# Patient Record
Sex: FEMALE | Race: WHITE
[De-identification: names, ages, dates, MRNs, and addresses within clinical notes are randomized per-mention and may not be internally consistent; named-entity substitution may affect disease eponyms.]

---

## 2017-02-23 ENCOUNTER — HOSPITAL ENCOUNTER (OUTPATIENT)
Dept: HOSPITAL 17 - CLAB | Age: 47
End: 2017-02-23
Payer: COMMERCIAL

## 2017-02-23 DIAGNOSIS — E55.9: Primary | ICD-10-CM

## 2017-02-23 PROCEDURE — 82306 VITAMIN D 25 HYDROXY: CPT

## 2017-02-23 PROCEDURE — 36415 COLL VENOUS BLD VENIPUNCTURE: CPT

## 2017-04-01 ENCOUNTER — HOSPITAL ENCOUNTER (EMERGENCY)
Dept: HOSPITAL 17 - NETRI | Age: 47
Discharge: HOME | End: 2017-04-01
Payer: COMMERCIAL

## 2017-04-01 VITALS
HEART RATE: 75 BPM | RESPIRATION RATE: 16 BRPM | DIASTOLIC BLOOD PRESSURE: 56 MMHG | SYSTOLIC BLOOD PRESSURE: 125 MMHG | OXYGEN SATURATION: 96 % | TEMPERATURE: 97.8 F

## 2017-04-01 VITALS — BODY MASS INDEX: 21.08 KG/M2 | HEIGHT: 64 IN | WEIGHT: 123.46 LBS

## 2017-04-01 DIAGNOSIS — E03.9: ICD-10-CM

## 2017-04-01 DIAGNOSIS — J45.909: ICD-10-CM

## 2017-04-01 DIAGNOSIS — F32.9: ICD-10-CM

## 2017-04-01 DIAGNOSIS — Z77.21: Primary | ICD-10-CM

## 2017-04-01 PROCEDURE — 99283 EMERGENCY DEPT VISIT LOW MDM: CPT

## 2017-04-01 NOTE — PD
HPI


Chief Complaint:  Eye Problems/Injury


Time Seen by Provider:  20:22


Travel History


International Travel<30 days:  No


Contact w/Intl Traveler<30days:  No


Traveled to known affect area:  No





History of Present Illness


HPI


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 

evaluation of a blood exposure to her face which occurred approximately 30-60 

minutes prior to arrival.  She states that blood from [employer] splattered into 

her face.  This had come from a trauma alert patient earlier.  Source patient 

is known.  The patient washed her face with a purple wipe before coming to the 

ER.  She states that she is up-to-date with immunizations.  She is up-to-date 

with tetanus and hepatitis B.  She denies any ocular pain.  No open sores on 

the face.  She does not feel that any blood when into her mouth.  Patient does 

not wear glasses or contacts





[facility]


Past Medical History


*** Narrative Medical


Depression, Asthma, hypothyroid


Asthma:  Yes


Depression:  Yes


Reproductive:  Yes (ovarian cyst)


Thyroid Disease:  Yes


Tetanus Vaccination:  < 5 Years


Pregnant?:  Not Pregnant


Dilation and Curettage (D&C):  Yes





Past Surgical History


Abdominal Surgery:  Yes (exp. lap)


Appendectomy:  Yes (1994)


Joint Replacement:  Yes (knee scope)


Other Surgery:  Yes (cervical ablation/breast augmentation)





Social History


Alcohol Use:  Yes (occ)


Tobacco Use:  No


Substance Use:  No





Allergies-Medications


(Allergen,Severity, Reaction):  


Coded Allergies:  


     Codeine (Verified  Allergy, Severe, 4/1/17)


     Phenergan (Verified  Allergy, Severe, 4/1/17)


Reported Meds & Prescriptions





Reported Meds & Active Scripts


Active


Mobic (Meloxicam) 15 Mg Tab 15 Mg PO DAILY 


Norco 5/325 (Hydrocodone/Acetaminophen 5/325) 5 mg/325 mg Tab 1 Tab PO Q6H PRN


Reported


Xanax 0.5 mg (Alprazolam) Alprazolam 0.5 mg Tab 1 Tab PO HS PRN


Trimox 500 Mg Cap (Amoxicillin) 500 Mg Cap 500 Mg PO BID 


Lexapro (Escitalopram Oxalate) 10 Mg Tab 10 Mg PO DAILY 


Synthroid 75 mcg (Levothyroxine Sodium) 75 Mcg Tab 75 Mcg PO DAILY 








Review of Systems


Except as stated in HPI:  all other systems reviewed are Neg


General / Constitutional:  No: Fever, Chills


Eyes:  No: Diploplia, Blurred Vision, Photophobia, Drainage, Redness, Foreign 

Body Sensation, Pain, Tearing, Visual changes





Physical Exam


Narrative


GENERAL: This is a well-nourished, well-developed patient, in no apparent 

distress.


SKIN: No rashes, ecchymoses or lesions. Warm and dry.


HEAD: Atraumatic. Normocephalic.


EYES: PERRL, EOMI, no discharge or injection. No scleral icterus.


EARS: Clear


NOSE: Nasal turbinates appear normal.


THROAT: Mucosa pink and moist.  Airway patent.


NECK: Trachea midline. supple, moves head freely.


LUNGS: Clear to auscultation.


CV: Regular in rhythm.


ABDOMEN: Soft nontender.


EXT: No clubbing cyanosis or edema.





Data


Data


Last Documented VS





Vital Signs








  Date Time  Temp Pulse Resp B/P Pulse Ox O2 Delivery O2 Flow Rate FiO2


 


4/1/17 19:34 97.8 75 16 125/56 96 Room Air  











MDM


Medical Decision Making


Medical Screen Exam Complete:  Yes


Emergency Medical Condition:  Yes


Medical Record Reviewed:  Yes


Differential Diagnosis


Differential diagnoses: Blood exposure, body fluid exposure, abrasion, contusion


Narrative Course


The patient's face is washed immediately here in the ER.  2 drops about cane 

are instilled in both eyes and she is irrigated at the eyewash station.  I have 

confirmed the patient's immunizations status.  Source patient is known and the 

supervisor is notified that a rapid HIV is required.  At this point post 

exposure prophylaxis is not indicated.  The patient will be notified of the 

source patient's HIV status.  A contact numbers been given.  The supervisor in 

the ER we'll notify the patient if the source patient's test is positive.  At 

that time we will revisit post exposure prophylaxis.





This is a body fluid/blood exposure





Diagnosis





 Primary Impression:  


 Exposure to blood or body fluid





***Additional Instructions:


Rest.


The night supervisor will contact you if the source patient is positive.  If 

you are not contacted and assumed the test to be negative.


Follow-up with employee med.


Disposition:  01 DISCHARGE HOME


Condition:  Stable








Jah Riddle Apr 1, 2017 20:28

## 2017-08-04 ENCOUNTER — HOSPITAL ENCOUNTER (OUTPATIENT)
Dept: HOSPITAL 17 - CLAB | Age: 47
End: 2017-08-04
Payer: COMMERCIAL

## 2017-08-04 DIAGNOSIS — E55.9: Primary | ICD-10-CM

## 2017-08-04 DIAGNOSIS — E03.9: ICD-10-CM

## 2017-08-04 LAB
ALP SERPL-CCNC: 43 U/L (ref 45–117)
ALT SERPL-CCNC: 20 U/L (ref 10–53)
ANION GAP SERPL CALC-SCNC: 10 MEQ/L (ref 5–15)
AST SERPL-CCNC: 13 U/L (ref 15–37)
BASOPHILS # BLD AUTO: 0.1 TH/MM3 (ref 0–0.2)
BASOPHILS NFR BLD: 0.9 % (ref 0–2)
BILIRUB SERPL-MCNC: 0.7 MG/DL (ref 0.2–1)
BUN SERPL-MCNC: 10 MG/DL (ref 7–18)
CHLORIDE SERPL-SCNC: 106 MEQ/L (ref 98–107)
EOSINOPHIL # BLD: 0.1 TH/MM3 (ref 0–0.4)
EOSINOPHIL NFR BLD: 1.1 % (ref 0–4)
ERYTHROCYTE [DISTWIDTH] IN BLOOD BY AUTOMATED COUNT: 11.8 % (ref 11.6–17.2)
GFR SERPLBLD BASED ON 1.73 SQ M-ARVRAT: 77 ML/MIN (ref 89–?)
HCO3 BLD-SCNC: 25 MEQ/L (ref 21–32)
HCT VFR BLD CALC: 42.6 % (ref 35–46)
HDLC SERPL-MCNC: 73.7 MG/DL (ref 40–60)
HEMO FLAGS: (no result)
LDLC SERPL-MCNC: 55 MG/DL (ref 0–99)
LYMPHOCYTES # BLD AUTO: 1.9 TH/MM3 (ref 1–4.8)
LYMPHOCYTES NFR BLD AUTO: 28.1 % (ref 9–44)
MCH RBC QN AUTO: 30.5 PG (ref 27–34)
MCHC RBC AUTO-ENTMCNC: 33.6 % (ref 32–36)
MCV RBC AUTO: 90.7 FL (ref 80–100)
MONOCYTES NFR BLD: 5.6 % (ref 0–8)
NEUTROPHILS # BLD AUTO: 4.2 TH/MM3 (ref 1.8–7.7)
NEUTROPHILS NFR BLD AUTO: 64.3 % (ref 16–70)
PLATELET # BLD: 202 TH/MM3 (ref 150–450)
POTASSIUM SERPL-SCNC: 3.9 MEQ/L (ref 3.5–5.1)
RBC # BLD AUTO: 4.7 MIL/MM3 (ref 4–5.3)
SODIUM SERPL-SCNC: 141 MEQ/L (ref 136–145)
T3FREE SERPL-MCNC: 2.32 PG/ML (ref 2.18–3.98)
T4 FREE SERPL-MCNC: 1.04 NG/DL (ref 0.76–1.46)
WBC # BLD AUTO: 6.6 TH/MM3 (ref 4–11)

## 2017-08-04 PROCEDURE — 84439 ASSAY OF FREE THYROXINE: CPT

## 2017-08-04 PROCEDURE — 82306 VITAMIN D 25 HYDROXY: CPT

## 2017-08-04 PROCEDURE — 80053 COMPREHEN METABOLIC PANEL: CPT

## 2017-08-04 PROCEDURE — 84443 ASSAY THYROID STIM HORMONE: CPT

## 2017-08-04 PROCEDURE — 36415 COLL VENOUS BLD VENIPUNCTURE: CPT

## 2017-08-04 PROCEDURE — 85025 COMPLETE CBC W/AUTO DIFF WBC: CPT

## 2017-08-04 PROCEDURE — 80061 LIPID PANEL: CPT

## 2017-08-04 PROCEDURE — 84481 FREE ASSAY (FT-3): CPT

## 2017-12-07 ENCOUNTER — HOSPITAL ENCOUNTER (OUTPATIENT)
Dept: HOSPITAL 17 - CLAB | Age: 47
End: 2017-12-07
Payer: COMMERCIAL

## 2017-12-07 DIAGNOSIS — E56.9: ICD-10-CM

## 2017-12-07 DIAGNOSIS — R63.5: ICD-10-CM

## 2017-12-07 DIAGNOSIS — M62.50: ICD-10-CM

## 2017-12-07 DIAGNOSIS — E03.9: Primary | ICD-10-CM

## 2017-12-07 DIAGNOSIS — E55.9: ICD-10-CM

## 2017-12-07 DIAGNOSIS — R60.1: ICD-10-CM

## 2017-12-07 DIAGNOSIS — M79.1: ICD-10-CM

## 2017-12-07 DIAGNOSIS — G47.9: ICD-10-CM

## 2017-12-07 DIAGNOSIS — R79.9: ICD-10-CM

## 2017-12-07 LAB
ALP SERPL-CCNC: 45 U/L (ref 45–117)
ALT SERPL-CCNC: 18 U/L (ref 10–53)
ANION GAP SERPL CALC-SCNC: 7 MEQ/L (ref 5–15)
AST SERPL-CCNC: 11 U/L (ref 15–37)
BASOPHILS # BLD AUTO: 0.1 TH/MM3 (ref 0–0.2)
BASOPHILS NFR BLD: 1.4 % (ref 0–2)
BILIRUB SERPL-MCNC: 0.7 MG/DL (ref 0.2–1)
BUN SERPL-MCNC: 19 MG/DL (ref 7–18)
CHLORIDE SERPL-SCNC: 108 MEQ/L (ref 98–107)
EOSINOPHIL # BLD: 0.1 TH/MM3 (ref 0–0.4)
EOSINOPHIL NFR BLD: 1.8 % (ref 0–4)
ERYTHROCYTE [DISTWIDTH] IN BLOOD BY AUTOMATED COUNT: 12.3 % (ref 11.6–17.2)
GFR SERPLBLD BASED ON 1.73 SQ M-ARVRAT: 68 ML/MIN (ref 89–?)
HCO3 BLD-SCNC: 24.8 MEQ/L (ref 21–32)
HCT VFR BLD CALC: 42.4 % (ref 35–46)
HDLC SERPL-MCNC: 74 MG/DL (ref 40–60)
HEMO FLAGS: (no result)
HEMOGLOBIN A1A: 1.3 %
HEMOGLOBIN A1B: 1.4 %
HEMOGLOBIN AO: 86.9 %
HEMOGLOBIN LA1C: 1.9 %
HEMOGLOBIN P3: 3.4 %
LDLC SERPL-MCNC: 63 MG/DL (ref 0–99)
LYMPHOCYTES # BLD AUTO: 1.7 TH/MM3 (ref 1–4.8)
LYMPHOCYTES NFR BLD AUTO: 32.2 % (ref 9–44)
MCH RBC QN AUTO: 30.9 PG (ref 27–34)
MCHC RBC AUTO-ENTMCNC: 34.2 % (ref 32–36)
MCV RBC AUTO: 90.5 FL (ref 80–100)
MONOCYTES NFR BLD: 7.1 % (ref 0–8)
NEUTROPHILS # BLD AUTO: 3.1 TH/MM3 (ref 1.8–7.7)
NEUTROPHILS NFR BLD AUTO: 57.5 % (ref 16–70)
PLATELET # BLD: 191 TH/MM3 (ref 150–450)
POTASSIUM SERPL-SCNC: 4.4 MEQ/L (ref 3.5–5.1)
RBC # BLD AUTO: 4.69 MIL/MM3 (ref 4–5.3)
SODIUM SERPL-SCNC: 140 MEQ/L (ref 136–145)
T3FREE SERPL-MCNC: 2.28 PG/ML (ref 2.18–3.98)
T4 FREE SERPL-MCNC: 0.95 NG/DL (ref 0.76–1.46)
TRANSFERRIN IRON PROFILE: 270 MG/DL (ref 200–360)
WBC # BLD AUTO: 5.4 TH/MM3 (ref 4–11)

## 2017-12-07 PROCEDURE — 83036 HEMOGLOBIN GLYCOSYLATED A1C: CPT

## 2017-12-07 PROCEDURE — 86141 C-REACTIVE PROTEIN HS: CPT

## 2017-12-07 PROCEDURE — 82533 TOTAL CORTISOL: CPT

## 2017-12-07 PROCEDURE — 80053 COMPREHEN METABOLIC PANEL: CPT

## 2017-12-07 PROCEDURE — 84305 ASSAY OF SOMATOMEDIN: CPT

## 2017-12-07 PROCEDURE — 84443 ASSAY THYROID STIM HORMONE: CPT

## 2017-12-07 PROCEDURE — 83540 ASSAY OF IRON: CPT

## 2017-12-07 PROCEDURE — 36415 COLL VENOUS BLD VENIPUNCTURE: CPT

## 2017-12-07 PROCEDURE — 84403 ASSAY OF TOTAL TESTOSTERONE: CPT

## 2017-12-07 PROCEDURE — 83550 IRON BINDING TEST: CPT

## 2017-12-07 PROCEDURE — 82306 VITAMIN D 25 HYDROXY: CPT

## 2017-12-07 PROCEDURE — 84481 FREE ASSAY (FT-3): CPT

## 2017-12-07 PROCEDURE — 82627 DEHYDROEPIANDROSTERONE: CPT

## 2017-12-07 PROCEDURE — 82671 ASSAY OF ESTROGENS: CPT

## 2017-12-07 PROCEDURE — 84439 ASSAY OF FREE THYROXINE: CPT

## 2017-12-07 PROCEDURE — 84144 ASSAY OF PROGESTERONE: CPT

## 2017-12-07 PROCEDURE — 85025 COMPLETE CBC W/AUTO DIFF WBC: CPT

## 2017-12-07 PROCEDURE — 80061 LIPID PANEL: CPT

## 2017-12-07 PROCEDURE — 84410 TESTOSTERONE BIOAVAILABLE: CPT

## 2017-12-08 LAB — DHEA-S SERPL-MCNC: 109 MCG/DL (ref 19–231)

## 2017-12-09 LAB
ESTRADIOL SERPL-MCNC: 81 PG/ML
TESTOSTERONE.FREE+WB SERPL-MCNC: 1.2 NG/DL

## 2017-12-10 LAB
Lab: (no result)
Lab: 0.2 SD
Lab: 159 NG/ML (ref 52–328)
PROGEST SERPL-MCNC: 18 NG/ML

## 2018-01-19 ENCOUNTER — HOSPITAL ENCOUNTER (OUTPATIENT)
Dept: HOSPITAL 17 - HDIC | Age: 48
Discharge: HOME | End: 2018-01-19
Payer: COMMERCIAL

## 2018-01-19 VITALS
HEART RATE: 75 BPM | OXYGEN SATURATION: 96 % | SYSTOLIC BLOOD PRESSURE: 114 MMHG | TEMPERATURE: 98 F | RESPIRATION RATE: 18 BRPM | DIASTOLIC BLOOD PRESSURE: 58 MMHG

## 2018-01-19 VITALS — WEIGHT: 134.92 LBS | BODY MASS INDEX: 22.48 KG/M2 | HEIGHT: 65 IN

## 2018-01-19 DIAGNOSIS — R07.9: Primary | ICD-10-CM

## 2018-01-19 LAB
BASOPHILS # BLD AUTO: 0.1 TH/MM3 (ref 0–0.2)
BASOPHILS NFR BLD: 0.9 % (ref 0–2)
BUN SERPL-MCNC: 15 MG/DL (ref 7–18)
CALCIUM SERPL-MCNC: 8.4 MG/DL (ref 8.5–10.1)
CHLORIDE SERPL-SCNC: 105 MEQ/L (ref 98–107)
CREAT SERPL-MCNC: 0.75 MG/DL (ref 0.5–1)
EOSINOPHIL # BLD: 0.1 TH/MM3 (ref 0–0.4)
EOSINOPHIL NFR BLD: 1.8 % (ref 0–4)
ERYTHROCYTE [DISTWIDTH] IN BLOOD BY AUTOMATED COUNT: 12.3 % (ref 11.6–17.2)
GFR SERPLBLD BASED ON 1.73 SQ M-ARVRAT: 82 ML/MIN (ref 89–?)
GLUCOSE SERPL-MCNC: 81 MG/DL (ref 74–106)
HCO3 BLD-SCNC: 27.5 MEQ/L (ref 21–32)
HCT VFR BLD CALC: 39.9 % (ref 35–46)
HGB BLD-MCNC: 13.9 GM/DL (ref 11.6–15.3)
INR PPP: 1.2 RATIO
LYMPHOCYTES # BLD AUTO: 1.9 TH/MM3 (ref 1–4.8)
LYMPHOCYTES NFR BLD AUTO: 30.6 % (ref 9–44)
MCH RBC QN AUTO: 31.7 PG (ref 27–34)
MCHC RBC AUTO-ENTMCNC: 34.8 % (ref 32–36)
MCV RBC AUTO: 91.1 FL (ref 80–100)
MONOCYTE #: 0.4 TH/MM3 (ref 0–0.9)
MONOCYTES NFR BLD: 5.9 % (ref 0–8)
NEUTROPHILS # BLD AUTO: 3.7 TH/MM3 (ref 1.8–7.7)
NEUTROPHILS NFR BLD AUTO: 60.8 % (ref 16–70)
PLATELET # BLD: 197 TH/MM3 (ref 150–450)
PMV BLD AUTO: 9.8 FL (ref 7–11)
PROTHROMBIN TIME: 12 SEC (ref 9.8–11.6)
RBC # BLD AUTO: 4.38 MIL/MM3 (ref 4–5.3)
SODIUM SERPL-SCNC: 140 MEQ/L (ref 136–145)
WBC # BLD AUTO: 6.1 TH/MM3 (ref 4–11)

## 2018-01-19 PROCEDURE — C1893 INTRO/SHEATH, FIXED,NON-PEEL: HCPCS

## 2018-01-19 PROCEDURE — C1769 GUIDE WIRE: HCPCS

## 2018-01-19 PROCEDURE — 93458 L HRT ARTERY/VENTRICLE ANGIO: CPT

## 2018-01-19 PROCEDURE — 80048 BASIC METABOLIC PNL TOTAL CA: CPT

## 2018-01-19 PROCEDURE — 85025 COMPLETE CBC W/AUTO DIFF WBC: CPT

## 2018-01-19 PROCEDURE — 85730 THROMBOPLASTIN TIME PARTIAL: CPT

## 2018-01-19 PROCEDURE — 84702 CHORIONIC GONADOTROPIN TEST: CPT

## 2018-01-19 PROCEDURE — 93005 ELECTROCARDIOGRAM TRACING: CPT

## 2018-01-19 PROCEDURE — 85610 PROTHROMBIN TIME: CPT

## 2018-01-19 NOTE — MA
cc:

DARREN SINCLAIR M.D.

****

 

 

DATE: 01/19/2018.

 

 

PROCEDURE PERFORMED:

Left heart catheterization, selective coronary angiography, left

ventriculography.

 

DESCRIPTION OF THE PROCEDURE IN DETAIL:

The patient was brought to the cardiac catheterization laboratory in a fasting

state after having signed informed consent.  The right radial region was

prepped and draped as per policy and anesthetized with 1% lidocaine.  Arterial

access was obtained via the right radial artery and a 6-Polish sheath placed.

Coronary arteriography was performed using a Benton catheter.  Left

ventriculography was done using a multipurpose catheter.  There were no

apparent immediate complications.  A radial artery compression band was applied

to her right wrist at the end of the case to achieve good hemostasis.

 

HEMODYNAMIC RESULTS:

Left ventricle 105 with an end-diastolic pressure of 10.

Aorta 121/64 with a mean of 90.

There was no significant transvalvular aortic gradient on pullback of the

pigtail catheter.

 

CORONARY ARTERIOGRAPHY:

The left main is normal.

 

The left anterior descending gives rise to a medium-sized diagonal.  No disease

is seen in the LAD system.

 

The left circumflex is a small vessel giving rise to a medium-sized obtuse

marginal.  No disease is seen in the left circumflex system.

 

The right coronary artery is a fairly large dominant vessel with no disease.

 

LEFT VENTRICULOGRAPHY:

Contrast injection of the left ventricle reveals no segmental wall motion

abnormalities.  Ejection fraction is estimated at 65%.

 

CONCLUSION:

1. Angiographically normal coronary arteries.

2. Normal left ventricular function with estimated ejection fraction of 65%.

 

 

 

                              _________________________________

                              MD SUZY Smalls/BERYL

D:  1/19/2018/2:31 PM

T:  1/19/2018/2:51 PM

Visit #:  [id number]

Job #:  [id number]

TRISTAN

## 2018-01-19 NOTE — CATHPROC
[employer] HIS Report

Study Information

Study Number    Admission           Scheduled Start             Study Start

 

[id number]    Jan 19 2018 11:44AM      01/19/2018 Jan 19 2018 1:33PM

 

[employer]

 

Cardiac Catheterization

 

Admit Source                Facility Department

 

Other                   [facility] - Cath Lab

 

Physician and Clinical Staff

Initial Adolph Gomez            Circulator     Esha Lomeli RN

 

                          Recorder      Karen Celestin,TYIS TECH2

 

                          Scrub        Karrie Marks,RT(R)

 

Procedures Performed

Procedure                     Location (Site)           Vessel Name

 

Coronary Angiograms                 LCA                Left Coronary

Coronary Angiograms                 RCA                Right Coronary

LV Gram-hand inj. LV                LV                 Ventricle

Wire insertion                   Radial (right)           Radial Art.

Equipment

Time            Description          Size      Mfg Part Number  Used/Scraped

                   TRANSDUCER, TRUWAVE              [id number]

13:49    GRIDER GARCIA                     *                 Used

                   W/STOCKCOCK                  *[id number]

                                           [id number]



                                           *[id number]

                   WIRE, HYDROSTEER 150CM             [id number]

14:22    [employer]                  150CM               Used

                   ANGLED GLIDE                  *[id number]

                                           [id number]

13:49    [employer]       SYRINGE, ANGIOMAT 150ML    150ML      *[id number] Used

                                           2SUB

       MEDICAL CONCEPT     DRAPE, RADIAL FEMORAL FULL

13:49                                *         *[id number] Used

       DEVELOPMENT       BODY

                                           [id number]

13:49    [employer]    PACK, CCL CUSTOM        *                 Used

                                           *[id number]

13:49    [employer]    SUPPORT, ARTERIAL ADULT            [id number] *[id number] Used

                                           [id number]

13:49    [employer]      PEN, SKIN DUAL W/ RULER    *                 Used

                                           *[id number]

                   BAND, RADIAL COMPRESSION TR          [id number]

14:30    [employer]                      24CM               Used

                   SHORT 24                    *[id number]

                   SHEATH, FR6 RADIAL PRELUDE

13:49    [employer]                      FR 6      [id number]   Used

                   EASE 11CM

                                           [id number]

13:49    [employer]      WIRE, EXCHANGE 260CM 3MMJ   260CM               Used

                                           *[id number]

                                           [id number]

13:49    [employer]          MANIFOLD, 4 PORT        *                 Used

                                           *[id number]

13:49    [employer]         OMNIPAQUE, 350 MG, 150ML    150ML      [id number]      Used

                                           [id number]

13:49    [employer]      BLANKET,WARM AIR CCL      *                 Used

                                           *[id number]

                   CATHETER, FR5 OPTITORQUE            

14:08    [id number]                     FR 5               Used

                   RADIAL TIG 4.0                 *[id number]

 

Equipment Model, Serial, Lot Number and Expiration Data

Description              Model Number      Serial Number   Lot Number       Expiration Date

RL PRICE 150CM

                                        [id number]         09-

ANGLED GLIDE

 

History: Current Medications

Medication         Dosage/Unit       Route     Frequency  Last Date/Time Taken

 

Albuterol

 

Synthroid

 

 

History: Allergies

Allergy              Reaction

 

codeine

 

Phenergan

 

promethazine

History: Risk Factors

                   Family History of

Hypertension   Dyslipidemia                 Previous MI    Previous Heart Failure

                   Premature CAD

No        No         No            No         No

Prior Valve

         Prior PCI      Prior CABG

Surgery

No        No         No

         Cerebrovascular   Peripheral Artery     Chronic Lung

On Dialysis                                    Diabetes

         Disease       Disease          Disease

No        No         No            Yes        No

 

 

History: Symptoms/Diagnosis Selection Items

Chest pain

 

SOB

 

 

History: Stress Tests

Stress or Imaging Studies Performed

 

No

 

 

History: Other

Current Smoker

 

No

 

Labs

Hgb (g/dl)       Hct (%)       WBC (l/cumm)        Platelets (thousands)

 

11.60-17.00      35.00-51.00     4.00-11.00         150..00

 

13.9          39.9        6.1             197

 

Glucose (mg/dl)    BUN (mg/dl)     Creatinine (mg/dl)    BUN:Creatinine (1:x)

74..00      7.00-18.00     0.50-1.30         10.00-20.00

 

81           15         0.7            21.4

 

Na (meq/l)       K (meq/l)

 

136..00     3.50-5.10

 

140          3.6

 

INR (PTT:PT)

 

0.90-1.10

 

1.2

 

CPK-MB (ng/ML)

 

0.50-3.60

 

Not Drawn

 

 

 

 

Medication

Medication Total Dose (Bolus/Oral)

Medication              Total Dosage/Unit

 

1% XYLOCAINE                 20 mL

 

BENADRYL                   50 mg

 

RADIAL COCKTAIL                5 mL (Bolus)

 

Medications (Bolus/Oral)

Medication           Time Given          Dosage/Unit       Administered By       Reason

 

BENADRYL            1/19/2018 2:16:00 PM      50 mg         Esha Lomeli

50 mg BENADRYL given in lab by Esha Lomeli, RN in Left Antecubital via Peripheral IV. Ordered by 
Adolph Valdes.

 

1% XYLOCAINE          1/19/2018 2:17:31 PM      20 mL         Adolph Valdes

20 mL 1% XYLOCAINE given in lab by Adolph Valdes in Right Radial via Subcutaneous. Ordered by KIM Valdes.

 

                                                         Ntg 200mcg Verapamil 2.5mg Heparin

RADIAL COCKTAIL         1/19/2018 2:20:22 PM      5 mL (Bolus)     Adolph Valdes

                                                         2500U

5 mL (Bolus) RADIAL COCKTAIL given in lab by Adolph Valdes in Right Radial via Radial. Using [Solution
 Name]. Ordered by Adolph Valdes. Reason:

Ntg 200mcg Verapamil 2.5mg Heparin 2500U.

 

Medication (Drip)

Medication           Time Given          Dosage/Unit      Concentration/Unit  Diluent (ml)  Solution

 

IV Bolus            1/19/2018 2:34:31 PM     500 mL (Bolus)                        NaCl .9

500 mL (Bolus) IV Bolus given in lab by Esha Lomeli, RN via Peripheral IV. Using NaCl .9. Ordered
 by Adolph Valdes.

 

IV Solutions          1/19/2018 1:50:17 PM      0 mL (IV)                 500       NaCl .9

Patient arrived on IV Solutions in Left Antecubital via Peripheral IV. Pump/Drip Flow = 20 ml/hr usin
g NaCl .9.

 

 

Initial Case Assessment

Cardiovascular

HR             Rhythm            NIBP             Chest Pain

 

65             SR              98/55            0

 

Edema Present        Skin color              Skin

 

None             Normal                Warm Dry

 

Circulatory - Right Pulses

Dorsalis Pedis       Femoral           Radial

 

2              2              3

 

Scale (0,1,2,3,4,d)

 

Scale (0,1,2,3,4,d)

 

Neurological State

              Oriented to time-place-

Alert                        Moves all extremities

              person

 

Respiration - General

Respiration Rate

              SpO2 (%)

(B/min)

11             100

 

Chronological Log

Time      Study Chronological Log

 

13:49:31    Patient arrived via Bed.

13:49:35  Patient Name, D.O.B, / Armband Verified By R.N.

 

13:49:36  Consent signed by the physician and the patient and verified by the Cath Lab staff.

 

13:49:39  Pre-op and post- op instructions given; patient acknowledges understanding of instructions.


      Verbal Stimulation=~VERBAL~ Physical Stimulation=~PHYSICAL~ Airway=~AIRWAY~ Respiration=~RESPIR
ATION~

13:49:42

      TOTAL=~TOTAL~. (0=absent, 1=limited, 2=present)

13:49:46  Presedation assessment performed by Cath Lab RN.

 

13:49:52  Allens test performed on the right radial and ulnar artery.

 

13:49:59  Patient has been NPO for More than 6Hrs.

 

13:50:02  Skin Breakdown- none

 

13:50:03  Patient Warmer Placed on the Table.

 

13:50:09  Tessa Prominences Protected

 

13:50:15  A # 20 IV was noted in the Antecubital (left). Grade = patent

 

13:50:17  Patient arrived on IV Solutions in Left Antecubital via Peripheral IV. Pump/Drip Flow = 20 
ml/hr using NaCl .9.

 

13:50:19  History and physical on the chart or being dictated.

      Vitals capture started with the following parameters, Patient=Adult, Interval=5 min, Initial Pr
kohtaf=222 mmHg,

13:50:23

      Deflation Rate=5 mmHg, Cuff placed on Left Arm

13:50:55  HR=65 bpm, NIBP=98/55 mmhg, VgR4=849.0 %, Resp=10 B/min, Pain=0, Osman=10, Tate=2

      Assessment: Initial Case, HR=65 BPM, Rhythm=SR, NIBP=98/55 mmhg, Chest Pain=0, Edema=None, [place] Siding
r=Normal,

      Skin = Warm, Dry

13:52:22  Right Pulses: Bartolome Ped=2, Femoral=2, Radial=3

      Neurological: State=Alert, Ox3, PONCE

      Respiration: Resp=11 B/min, JoJ7=427 %

13:53:54  Right Radial and groin prepped with 2% chlorhexidine, and draped after a 3 min. waiting sarmad
e.

 

13:55:52  HR=61 bpm, NIBP=99/53 mmhg, Resp=15 B/min, Pain=0, Osman=10, Tate=2

 

14:00:53  HR=65 bpm, CYNJ=318/58 mmhg, Resp=18 B/min, Pain=0, Osman=10, Tate=2

 

14:04:22  Pressure channel 1 zeroed.

 

14:05:55  HR=64 bpm, NIBP=98/56 mmhg, Resp=15 B/min, Pain=0, Osman=10, Tate=2

 

14:06:11  Reference ECG taken

 

14:09:00  MD paged

 

14:10:54  HR=65 bpm, NIBP=97/59 mmhg, Resp=19 B/min, Pain=0, Osman=10, Tate=2

 

14:14:15  MD arrived.

 

14:15:55  HR=69 bpm, FDBD=631/56 mmhg, SpO2=99.0 %, Resp=35 B/min, Pain=0, Omsan=10, Tate=2

 

14:16:00  50 mg BENADRYL given in lab by Esha Lomeli RN in Left Antecubital via Peripheral IV. O
rdered by Adolph Valdes.

      Time Out. Correct patient, correct procedure, correct physician, power injector not loaded with
 contrast with surgical

14:16:42

      team present. Time Out Concurred by MD and individual staff in procedure.

14:17:19  Case Start

 

14:17:31  20 mL 1% XYLOCAINE given in lab by Adolph Valdes in Right Radial via Subcutaneous. Ordered b
y Adolph Valdes.

 

14:19:32  Access site was Right Radial Artery.

 

14:19:55  A wire was inserted via Radial (right).

      A SHEATH, FR6 RADIAL PRELUDE EASE 11CM FR 6 was advanced into the Radial (right) using the Perc
utaneous

14:20:11

      technique.

      5 mL (Bolus) RADIAL COCKTAIL given in lab by Adolph Valdes in Right Radial via Radial. Using [So
lution Name]. Ordered

14:20:22

      by Adolph Valdes. Reason: Ntg 200mcg Verapamil 2.5mg Heparin 2500U.

14:21:00  HR=69 bpm, AAUG=758/54 mmhg, RvG5=123.0 %, Resp=11 B/min, Pain=0, Osman=10, Tate=2

      A CATHETER, FR5 OPTITORQUE RADIAL TIG 4.0 FR 5 was advanced over a wire. OMNIPAQUE, 350 MG, 150
ML 150ML

14:21:31

      was used for injections.

14:22:33   A WIRE, HYDROSTEER 150CM ANGLED GLIDE 150CM was inserted via Radial (right).

 

14:23:09   The previous wire was exchanged for a WIRE, EXCHANGE 260CM 3MMJ 260CM.

 

14:24:42   The  RCA was injected and visualized at various angles. OMNIPAQUE, 350 MG, 150ML 150ML use
d.

       Recorded Pressure: Ao, HR=70, Condition=Condition 1

14:25:20

       (Aorta) Ao 97/61/77

14:25:31   The  LCA was injected and visualized at various angles. OMNIPAQUE, 350 MG, 150ML 150ML use
d.

 

14:26:01   HR=70 bpm, PTPI=295/50 mmhg, SpO2=99.0 %, Resp=19 B/min

 

14:26:58   Catheter was removed

       A MPA-2 INFINITI CATHETER FR 6 was advanced over a wire. OMNIPAQUE, 350 MG, 150ML 150ML was us
ed for

14:27:33

       injections.

       Recorded Pressure: LV, HR=69, Condition=Condition 1

14:28:43

       (Left Ventricle) /12/16

       Recorded Pressure: LV, Ao, HR=72, Condition=Condition 1

14:29:00   (Left Ventricle) /10/15,

       (Aorta) Ao 121/64/90

14:29:00   The LV was manually injected with 10 cc's and visualized. OMNIPAQUE, 350 MG, 150ML 150ML u
sed.

       Recorded Pressure: LV, HR=69, Condition=Condition 1

14:29:05

       (Left Ventricle) /59/75

14:29:14   Catheter was removed

 

14:29:55   Case End

 

14:31:00   HR=74 bpm, AANF=641/48 mmhg, KfZ0=117.0 %, Resp=15 B/min, Pain=0, Osman=10, Tate=2

 

14:31:43   No case complications noted.

 

14:31:47   Cine recording checked.

 

14:31:51   Bedside Report will be given.

       Radial Compression Device Used. 10 mLs of air placed in BAND, RADIAL COMPRESSION TR SHORT 24 2
4CM. Affected

14:32:09

       hand 100 % O2 saturation.

14:34:31   500 mL (Bolus) IV Bolus given in lab by Esha Lomeli, RN via Peripheral IV. Using NaCl 
.9. Ordered by Adolph Valdes.

 

14:35:46   Vitals capture stopped.

 

14:41:58   Patient moved to stretcher

 

14:42:06   Patient transported to DOCU

 

 

 

 

End Study - Contrast Media Used In Study

Contrast       Total Opened (mL)    Total Used (mL)       Total Wasted (mL)

 

Omnipaque       45           45             0

 

End Study - Maximum Contrast Load

Max Contrast Load (mL)

 

438.3

 

End Study - Radiation Exposure

Fluoro Time

(minutes)

2.7

End Study - Sheaths

Sheaths Pulled By          Sheath Hold Time (min)

 

Karrie Marks

 

 

End Study - Patient Disposition

Complications    Transferred To     Interventional Outcome

 

No         Telemetry Bed      No attempt made

## 2018-01-20 NOTE — EKG
Date Performed: 01/19/2018       Time Performed: 12:39:54

 

PTAGE:      47 years

 

EKG:      Sinus rhythm 

 

. Normal ECG 

 

NO PREVIOUS TRACING            

 

DOCTOR:   Kaushik Ibarra  Interpretating Date/Time  01/20/2018 13:00:33

## 2018-02-28 ENCOUNTER — HOSPITAL ENCOUNTER (OUTPATIENT)
Dept: HOSPITAL 17 - CLAB | Age: 48
End: 2018-02-28
Payer: COMMERCIAL

## 2018-02-28 DIAGNOSIS — R63.5: ICD-10-CM

## 2018-02-28 DIAGNOSIS — R63.4: ICD-10-CM

## 2018-02-28 DIAGNOSIS — E34.9: ICD-10-CM

## 2018-02-28 DIAGNOSIS — E07.9: ICD-10-CM

## 2018-02-28 DIAGNOSIS — R60.1: Primary | ICD-10-CM

## 2018-02-28 DIAGNOSIS — R19.8: ICD-10-CM

## 2018-02-28 DIAGNOSIS — R53.83: ICD-10-CM

## 2018-02-28 DIAGNOSIS — R79.9: ICD-10-CM

## 2018-02-28 DIAGNOSIS — G47.9: ICD-10-CM

## 2018-02-28 DIAGNOSIS — M79.1: ICD-10-CM

## 2018-02-28 LAB
ALBUMIN SERPL-MCNC: 4.2 GM/DL (ref 3.4–5)
ALP SERPL-CCNC: 42 U/L (ref 45–117)
ALT SERPL-CCNC: 16 U/L (ref 10–53)
AST SERPL-CCNC: 15 U/L (ref 15–37)
BASOPHILS # BLD AUTO: 0 TH/MM3 (ref 0–0.2)
BASOPHILS NFR BLD: 1.1 % (ref 0–2)
BILIRUB SERPL-MCNC: 0.7 MG/DL (ref 0.2–1)
BUN SERPL-MCNC: 12 MG/DL (ref 7–18)
CALCIUM SERPL-MCNC: 8.9 MG/DL (ref 8.5–10.1)
CHLORIDE SERPL-SCNC: 108 MEQ/L (ref 98–107)
CHOLEST SERPL-MCNC: 132 MG/DL (ref 120–200)
CHOLESTEROL/ HDL RATIO: 2 RATIO
CREAT SERPL-MCNC: 0.94 MG/DL (ref 0.5–1)
EOSINOPHIL # BLD: 0.1 TH/MM3 (ref 0–0.4)
EOSINOPHIL NFR BLD: 2.4 % (ref 0–4)
ERYTHROCYTE [DISTWIDTH] IN BLOOD BY AUTOMATED COUNT: 12 % (ref 11.6–17.2)
FOLATE SERPL-MCNC: 15.7 NG/ML (ref 3.1–17.5)
GFR SERPLBLD BASED ON 1.73 SQ M-ARVRAT: 64 ML/MIN (ref 89–?)
HCO3 BLD-SCNC: 23.9 MEQ/L (ref 21–32)
HCT VFR BLD CALC: 40.5 % (ref 35–46)
HDLC SERPL-MCNC: 65.9 MG/DL (ref 40–60)
HGB BLD-MCNC: 14.2 GM/DL (ref 11.6–15.3)
LDLC SERPL-MCNC: 55 MG/DL (ref 0–99)
LYMPHOCYTES # BLD AUTO: 1.4 TH/MM3 (ref 1–4.8)
LYMPHOCYTES NFR BLD AUTO: 30.5 % (ref 9–44)
MCH RBC QN AUTO: 31.4 PG (ref 27–34)
MCHC RBC AUTO-ENTMCNC: 35 % (ref 32–36)
MCV RBC AUTO: 89.7 FL (ref 80–100)
MONOCYTE #: 0.3 TH/MM3 (ref 0–0.9)
MONOCYTES NFR BLD: 6.6 % (ref 0–8)
NEUTROPHILS # BLD AUTO: 2.7 TH/MM3 (ref 1.8–7.7)
NEUTROPHILS NFR BLD AUTO: 59.4 % (ref 16–70)
PHOSPHATE SERPL-MCNC: 3.1 MG/DL (ref 2.5–4.9)
PLATELET # BLD: 190 TH/MM3 (ref 150–450)
PMV BLD AUTO: 9 FL (ref 7–11)
PROT SERPL-MCNC: 7.3 GM/DL (ref 6.4–8.2)
RBC # BLD AUTO: 4.52 MIL/MM3 (ref 4–5.3)
SODIUM SERPL-SCNC: 140 MEQ/L (ref 136–145)
T4 FREE SERPL-MCNC: 0.77 NG/DL (ref 0.76–1.46)
TRIGL SERPL-MCNC: 57 MG/DL (ref 42–150)
VIT B12 SERPL-MCNC: (no result) PG/ML (ref 193–986)
WBC # BLD AUTO: 4.5 TH/MM3 (ref 4–11)

## 2018-02-28 PROCEDURE — 36415 COLL VENOUS BLD VENIPUNCTURE: CPT

## 2018-02-28 PROCEDURE — 80061 LIPID PANEL: CPT

## 2018-02-28 PROCEDURE — 84410 TESTOSTERONE BIOAVAILABLE: CPT

## 2018-02-28 PROCEDURE — 84100 ASSAY OF PHOSPHORUS: CPT

## 2018-02-28 PROCEDURE — 86141 C-REACTIVE PROTEIN HS: CPT

## 2018-02-28 PROCEDURE — 84443 ASSAY THYROID STIM HORMONE: CPT

## 2018-02-28 PROCEDURE — 84144 ASSAY OF PROGESTERONE: CPT

## 2018-02-28 PROCEDURE — 82306 VITAMIN D 25 HYDROXY: CPT

## 2018-02-28 PROCEDURE — 82627 DEHYDROEPIANDROSTERONE: CPT

## 2018-02-28 PROCEDURE — 84439 ASSAY OF FREE THYROXINE: CPT

## 2018-02-28 PROCEDURE — 82746 ASSAY OF FOLIC ACID SERUM: CPT

## 2018-02-28 PROCEDURE — 84305 ASSAY OF SOMATOMEDIN: CPT

## 2018-02-28 PROCEDURE — 83525 ASSAY OF INSULIN: CPT

## 2018-02-28 PROCEDURE — 83090 ASSAY OF HOMOCYSTEINE: CPT

## 2018-02-28 PROCEDURE — 84403 ASSAY OF TOTAL TESTOSTERONE: CPT

## 2018-02-28 PROCEDURE — 80053 COMPREHEN METABOLIC PANEL: CPT

## 2018-02-28 PROCEDURE — 82607 VITAMIN B-12: CPT

## 2018-02-28 PROCEDURE — 85025 COMPLETE CBC W/AUTO DIFF WBC: CPT

## 2018-02-28 PROCEDURE — 82671 ASSAY OF ESTROGENS: CPT

## 2018-03-02 LAB
CRP SERPL HS-MCNC: 0.3 MG/L
DHEA-S SERPL-MCNC: 87 MCG/DL (ref 19–231)
ESTRADIOL SERPL-MCNC: 144 PG/ML
ESTRONE SERPL-MCNC: 82 PG/ML
FREE TESTOSTERONE: 0.19 NG/DL (ref 0.06–0.95)
TESTOST SERPL-MCNC: 19 NG/DL (ref 8–60)
TESTOSTERONE.FREE+WB SERPL-MCNC: 1.1 NG/DL

## 2018-04-27 ENCOUNTER — HOSPITAL ENCOUNTER (OUTPATIENT)
Dept: HOSPITAL 17 - CLAB | Age: 48
End: 2018-04-27
Payer: COMMERCIAL

## 2018-04-27 DIAGNOSIS — R53.83: ICD-10-CM

## 2018-04-27 DIAGNOSIS — M62.50: ICD-10-CM

## 2018-04-27 DIAGNOSIS — N95.2: ICD-10-CM

## 2018-04-27 DIAGNOSIS — E07.9: ICD-10-CM

## 2018-04-27 DIAGNOSIS — R79.9: Primary | ICD-10-CM

## 2018-04-27 DIAGNOSIS — G47.9: ICD-10-CM

## 2018-04-27 DIAGNOSIS — E56.9: ICD-10-CM

## 2018-04-27 DIAGNOSIS — E34.9: ICD-10-CM

## 2018-04-27 DIAGNOSIS — R63.5: ICD-10-CM

## 2018-04-27 DIAGNOSIS — R68.82: ICD-10-CM

## 2018-04-27 LAB
T3FREE SERPL-MCNC: 3.29 PG/ML (ref 2.18–3.98)
T4 FREE SERPL-MCNC: 0.97 NG/DL (ref 0.76–1.46)

## 2018-04-27 PROCEDURE — 84481 FREE ASSAY (FT-3): CPT

## 2018-04-27 PROCEDURE — 84144 ASSAY OF PROGESTERONE: CPT

## 2018-04-27 PROCEDURE — 82627 DEHYDROEPIANDROSTERONE: CPT

## 2018-04-27 PROCEDURE — 84403 ASSAY OF TOTAL TESTOSTERONE: CPT

## 2018-04-27 PROCEDURE — 82671 ASSAY OF ESTROGENS: CPT

## 2018-04-27 PROCEDURE — 84410 TESTOSTERONE BIOAVAILABLE: CPT

## 2018-04-27 PROCEDURE — 84439 ASSAY OF FREE THYROXINE: CPT

## 2018-04-27 PROCEDURE — 84443 ASSAY THYROID STIM HORMONE: CPT

## 2018-04-27 PROCEDURE — 84305 ASSAY OF SOMATOMEDIN: CPT

## 2018-04-27 PROCEDURE — 36415 COLL VENOUS BLD VENIPUNCTURE: CPT

## 2018-04-28 LAB — DHEA-S SERPL-MCNC: 101 MCG/DL (ref 19–231)

## 2018-04-29 LAB — PROGEST SERPL-MCNC: 3.4 NG/ML

## 2018-04-30 LAB
ESTRADIOL SERPL-MCNC: 60 PG/ML
ESTRONE SERPL-MCNC: 52 PG/ML
FREE TESTOSTERONE: 0.19 NG/DL (ref 0.06–0.95)
TESTOST SERPL-MCNC: 16 NG/DL (ref 8–60)
TESTOSTERONE.FREE+WB SERPL-MCNC: (no result) NG/DL

## 2019-04-02 ENCOUNTER — APPOINTMENT (RX ONLY)
Dept: URBAN - METROPOLITAN AREA CLINIC 77 | Facility: CLINIC | Age: 49
Setting detail: DERMATOLOGY
End: 2019-04-02

## 2019-04-02 VITALS — WEIGHT: 129 LBS | HEIGHT: 66 IN

## 2019-04-02 DIAGNOSIS — L21.8 OTHER SEBORRHEIC DERMATITIS: ICD-10-CM

## 2019-04-02 PROBLEM — L85.3 XEROSIS CUTIS: Status: ACTIVE | Noted: 2019-04-02

## 2019-04-02 PROBLEM — E03.9 HYPOTHYROIDISM, UNSPECIFIED: Status: ACTIVE | Noted: 2019-04-02

## 2019-04-02 PROBLEM — J45.909 UNSPECIFIED ASTHMA, UNCOMPLICATED: Status: ACTIVE | Noted: 2019-04-02

## 2019-04-02 PROBLEM — L29.8 OTHER PRURITUS: Status: ACTIVE | Noted: 2019-04-02

## 2019-04-02 PROCEDURE — 99213 OFFICE O/P EST LOW 20 MIN: CPT

## 2019-04-02 PROCEDURE — ? COUNSELING

## 2019-04-02 PROCEDURE — ? ADDITIONAL NOTES

## 2019-04-02 PROCEDURE — ? PRESCRIPTION

## 2019-04-02 RX ORDER — KETOCONAZOLE 20 MG/G
CREAM TOPICAL BID
Qty: 1 | Refills: 3 | Status: ERX | COMMUNITY
Start: 2019-04-02

## 2019-04-02 RX ORDER — HYDROCORTISONE 25 MG/G
CREAM TOPICAL
Qty: 2 | Refills: 1 | Status: ERX | COMMUNITY
Start: 2019-04-02

## 2019-04-02 RX ADMIN — KETOCONAZOLE: 20 CREAM TOPICAL at 00:00

## 2019-04-02 RX ADMIN — HYDROCORTISONE: 25 CREAM TOPICAL at 00:00

## 2019-04-02 ASSESSMENT — LOCATION ZONE DERM: LOCATION ZONE: FACE

## 2019-04-02 ASSESSMENT — LOCATION DETAILED DESCRIPTION DERM: LOCATION DETAILED: RIGHT SUPERIOR MEDIAL BUCCAL CHEEK

## 2019-04-02 ASSESSMENT — LOCATION SIMPLE DESCRIPTION DERM: LOCATION SIMPLE: RIGHT CHEEK

## 2019-04-02 NOTE — PROCEDURE: ADDITIONAL NOTES
Detail Level: Zone
Additional Notes: Pt consulted with [facility] for general face skin care and product recommendations

## 2019-04-25 ENCOUNTER — APPOINTMENT (RX ONLY)
Dept: URBAN - METROPOLITAN AREA CLINIC 77 | Facility: CLINIC | Age: 49
Setting detail: DERMATOLOGY
End: 2019-04-25

## 2019-04-25 DIAGNOSIS — L21.8 OTHER SEBORRHEIC DERMATITIS: ICD-10-CM | Status: WELL CONTROLLED

## 2019-04-25 DIAGNOSIS — L82.1 OTHER SEBORRHEIC KERATOSIS: ICD-10-CM

## 2019-04-25 PROCEDURE — 99213 OFFICE O/P EST LOW 20 MIN: CPT

## 2019-04-25 PROCEDURE — ? COUNSELING

## 2019-04-25 PROCEDURE — ? ADDITIONAL NOTES

## 2019-04-25 ASSESSMENT — LOCATION ZONE DERM: LOCATION ZONE: FACE

## 2019-04-25 ASSESSMENT — LOCATION SIMPLE DESCRIPTION DERM
LOCATION SIMPLE: RIGHT CHEEK
LOCATION SIMPLE: INFERIOR FOREHEAD

## 2019-04-25 ASSESSMENT — LOCATION DETAILED DESCRIPTION DERM
LOCATION DETAILED: RIGHT SUPERIOR MEDIAL BUCCAL CHEEK
LOCATION DETAILED: INFERIOR MID FOREHEAD

## 2019-04-25 NOTE — PROCEDURE: ADDITIONAL NOTES
Additional Notes: Continue with ketoconazole 2% cream can discontinue hydrocortisone 2.5% until flare then use bid x two weeks max prn flare
Detail Level: Zone

## 2019-04-25 NOTE — HPI: FREE FORM (FOLLOW UP HISTORY)
How Severe Is Your Skin Condition?: mild
What Brings You In Today For Follow Up? (This Is An [age]x Year Old Patient Who Is Following Up For:): Follow up on seb derm
Where On Your Body Is It? (Located On:): Face
What Was It Treated With? (The Condition Was Treated With:): Ketoconazole 2% cream and hydrocortisone 2.5% cream
How Did You Use It? (The Patient Used The Treatment In The Following Way:): Bid
Since The Treatment Is Your Condition Better, Worse, Or Unchanged? (Since The Treatment, The Condition Is:): Positive improvement

## 2019-08-01 ENCOUNTER — APPOINTMENT (RX ONLY)
Dept: URBAN - METROPOLITAN AREA CLINIC 77 | Facility: CLINIC | Age: 49
Setting detail: DERMATOLOGY
End: 2019-08-01

## 2019-08-01 DIAGNOSIS — Z41.9 ENCOUNTER FOR PROCEDURE FOR PURPOSES OTHER THAN REMEDYING HEALTH STATE, UNSPECIFIED: ICD-10-CM

## 2019-08-01 PROCEDURE — ? BOTOX (U OR CC)

## 2019-08-01 PROCEDURE — ? INVENTORY

## 2019-08-01 ASSESSMENT — LOCATION DETAILED DESCRIPTION DERM
LOCATION DETAILED: LEFT CENTRAL EYEBROW
LOCATION DETAILED: RIGHT INFERIOR LATERAL FOREHEAD
LOCATION DETAILED: RIGHT MEDIAL FOREHEAD
LOCATION DETAILED: RIGHT INFERIOR MEDIAL FOREHEAD
LOCATION DETAILED: RIGHT CENTRAL EYEBROW
LOCATION DETAILED: LEFT LATERAL FOREHEAD
LOCATION DETAILED: RIGHT FOREHEAD
LOCATION DETAILED: LEFT FOREHEAD
LOCATION DETAILED: RIGHT INFERIOR FOREHEAD
LOCATION DETAILED: LEFT INFERIOR FOREHEAD
LOCATION DETAILED: GLABELLA

## 2019-08-01 ASSESSMENT — LOCATION ZONE DERM: LOCATION ZONE: FACE

## 2019-08-01 ASSESSMENT — LOCATION SIMPLE DESCRIPTION DERM
LOCATION SIMPLE: GLABELLA
LOCATION SIMPLE: RIGHT FOREHEAD
LOCATION SIMPLE: LEFT FOREHEAD
LOCATION SIMPLE: LEFT EYEBROW
LOCATION SIMPLE: RIGHT EYEBROW

## 2019-08-01 NOTE — PROCEDURE: BOTOX (U OR CC)
Jose Reyna is a 31 y.o. female here today for follow up, Neutrophilia. Quantity Per Injection Site (Units): 5

## 2019-12-03 ENCOUNTER — APPOINTMENT (RX ONLY)
Dept: URBAN - METROPOLITAN AREA CLINIC 80 | Facility: CLINIC | Age: 49
Setting detail: DERMATOLOGY
End: 2019-12-03

## 2019-12-03 DIAGNOSIS — Z41.9 ENCOUNTER FOR PROCEDURE FOR PURPOSES OTHER THAN REMEDYING HEALTH STATE, UNSPECIFIED: ICD-10-CM

## 2019-12-03 PROCEDURE — ? ADDITIONAL NOTES

## 2019-12-03 PROCEDURE — ? BOTOX

## 2019-12-03 NOTE — PROCEDURE: BOTOX
Price (Use Numbers Only, No Special Characters Or $): 672 Price (Use Numbers Only, No Special Characters Or $): 214